# Patient Record
Sex: FEMALE | Race: WHITE | Employment: OTHER | URBAN - METROPOLITAN AREA
[De-identification: names, ages, dates, MRNs, and addresses within clinical notes are randomized per-mention and may not be internally consistent; named-entity substitution may affect disease eponyms.]

---

## 2024-06-03 SDOH — HEALTH STABILITY: PHYSICAL HEALTH: ON AVERAGE, HOW MANY DAYS PER WEEK DO YOU ENGAGE IN MODERATE TO STRENUOUS EXERCISE (LIKE A BRISK WALK)?: 0 DAYS

## 2024-06-06 ENCOUNTER — OFFICE VISIT (OUTPATIENT)
Dept: FAMILY MEDICINE CLINIC | Age: 64
End: 2024-06-06
Payer: COMMERCIAL

## 2024-06-06 VITALS
SYSTOLIC BLOOD PRESSURE: 118 MMHG | WEIGHT: 191.3 LBS | HEIGHT: 63 IN | HEART RATE: 76 BPM | DIASTOLIC BLOOD PRESSURE: 78 MMHG | BODY MASS INDEX: 33.89 KG/M2 | OXYGEN SATURATION: 96 %

## 2024-06-06 DIAGNOSIS — K58.0 IRRITABLE BOWEL SYNDROME WITH DIARRHEA: Primary | ICD-10-CM

## 2024-06-06 DIAGNOSIS — Z12.11 ENCOUNTER FOR SCREENING COLONOSCOPY: ICD-10-CM

## 2024-06-06 DIAGNOSIS — R10.31 GROIN PAIN, RIGHT: ICD-10-CM

## 2024-06-06 DIAGNOSIS — Z12.31 ENCOUNTER FOR SCREENING MAMMOGRAM FOR MALIGNANT NEOPLASM OF BREAST: ICD-10-CM

## 2024-06-06 DIAGNOSIS — Z13.1 DIABETES MELLITUS SCREENING: ICD-10-CM

## 2024-06-06 DIAGNOSIS — Z13.6 SCREENING FOR CARDIOVASCULAR CONDITION: ICD-10-CM

## 2024-06-06 DIAGNOSIS — E28.39 OVARIAN FAILURE: ICD-10-CM

## 2024-06-06 PROCEDURE — 99203 OFFICE O/P NEW LOW 30 MIN: CPT | Performed by: FAMILY MEDICINE

## 2024-06-06 PROCEDURE — 1123F ACP DISCUSS/DSCN MKR DOCD: CPT | Performed by: FAMILY MEDICINE

## 2024-06-06 SDOH — ECONOMIC STABILITY: FOOD INSECURITY: WITHIN THE PAST 12 MONTHS, YOU WORRIED THAT YOUR FOOD WOULD RUN OUT BEFORE YOU GOT MONEY TO BUY MORE.: NEVER TRUE

## 2024-06-06 SDOH — ECONOMIC STABILITY: HOUSING INSECURITY
IN THE LAST 12 MONTHS, WAS THERE A TIME WHEN YOU DID NOT HAVE A STEADY PLACE TO SLEEP OR SLEPT IN A SHELTER (INCLUDING NOW)?: NO

## 2024-06-06 SDOH — ECONOMIC STABILITY: FOOD INSECURITY: WITHIN THE PAST 12 MONTHS, THE FOOD YOU BOUGHT JUST DIDN'T LAST AND YOU DIDN'T HAVE MONEY TO GET MORE.: NEVER TRUE

## 2024-06-06 SDOH — ECONOMIC STABILITY: INCOME INSECURITY: HOW HARD IS IT FOR YOU TO PAY FOR THE VERY BASICS LIKE FOOD, HOUSING, MEDICAL CARE, AND HEATING?: NOT VERY HARD

## 2024-06-06 ASSESSMENT — ENCOUNTER SYMPTOMS
DIARRHEA: 1
CHEST TIGHTNESS: 0
VOMITING: 0
CONSTIPATION: 0
SHORTNESS OF BREATH: 0
NAUSEA: 0
BLOOD IN STOOL: 0
ABDOMINAL PAIN: 0

## 2024-06-06 ASSESSMENT — PATIENT HEALTH QUESTIONNAIRE - PHQ9
SUM OF ALL RESPONSES TO PHQ QUESTIONS 1-9: 0
2. FEELING DOWN, DEPRESSED OR HOPELESS: NOT AT ALL
SUM OF ALL RESPONSES TO PHQ QUESTIONS 1-9: 0
SUM OF ALL RESPONSES TO PHQ QUESTIONS 1-9: 0
SUM OF ALL RESPONSES TO PHQ9 QUESTIONS 1 & 2: 0
1. LITTLE INTEREST OR PLEASURE IN DOING THINGS: NOT AT ALL
SUM OF ALL RESPONSES TO PHQ QUESTIONS 1-9: 0

## 2024-06-06 NOTE — PROGRESS NOTES
disease labs   - Celiac Disease Panel; Future    2. Groin pain, right  Pt instructed to try heat and stretches TID and may try for 1-2 weeks with food an alleve daily  [place]sea in 2wks     3. Encounter for screening colonoscopy  Pt referred for colonoscopy   - Roberto Parrish MD, Internal Medicine, [place]    4. Encounter for screening mammogram for malignant neoplasm of breast  Pt to schedule mammogram   - YAIR DIGITAL SCREEN W OR WO CAD BILATERAL; Future    5. Ovarian failure  Pt to schedule DEXA   - DEXA BONE DENSITY 2 SITES; Future    6. Diabetes mellitus screening  Labs ordered for next week   - Basic Metabolic Panel; Future    7. Screening for cardiovascular condition  Fasting labs to be done next week   - Lipid Panel; Future      FF 39min     Return in about 2 weeks (around 6/20/2024) for chelsea and go over labs and test, Rt groin pain .      Electronically signed by Cici Dubon MD on 6/6/2024 at 11:47 PM

## 2024-06-07 ASSESSMENT — ENCOUNTER SYMPTOMS
COUGH: 0
EYE DISCHARGE: 0
EYE REDNESS: 0

## 2024-06-12 ENCOUNTER — HOSPITAL ENCOUNTER (OUTPATIENT)
Dept: MAMMOGRAPHY | Age: 64
Discharge: HOME | End: 2024-06-14
Attending: FAMILY MEDICINE
Payer: COMMERCIAL

## 2024-06-12 ENCOUNTER — HOSPITAL ENCOUNTER (OUTPATIENT)
Age: 64
Discharge: HOME | End: 2024-06-12
Attending: FAMILY MEDICINE
Payer: COMMERCIAL

## 2024-06-12 ENCOUNTER — HOSPITAL ENCOUNTER (OUTPATIENT)
Dept: BONE DENSITY | Age: 64
Discharge: HOME | End: 2024-06-14
Attending: FAMILY MEDICINE
Payer: COMMERCIAL

## 2024-06-12 DIAGNOSIS — Z12.31 ENCOUNTER FOR SCREENING MAMMOGRAM FOR MALIGNANT NEOPLASM OF BREAST: ICD-10-CM

## 2024-06-12 DIAGNOSIS — E28.39 OVARIAN FAILURE: ICD-10-CM

## 2024-06-12 DIAGNOSIS — K58.0 IRRITABLE BOWEL SYNDROME WITH DIARRHEA: ICD-10-CM

## 2024-06-12 DIAGNOSIS — Z13.6 SCREENING FOR CARDIOVASCULAR CONDITION: ICD-10-CM

## 2024-06-12 DIAGNOSIS — Z13.1 DIABETES MELLITUS SCREENING: ICD-10-CM

## 2024-06-12 LAB
ANION GAP SERPL CALCULATED.3IONS-SCNC: 12 MMOL/L (ref 9–17)
BUN SERPL-MCNC: 8 MG/DL (ref 8–23)
BUN/CREAT SERPL: 11 (ref 9–20)
CALCIUM SERPL-MCNC: 10.7 MG/DL (ref 8.6–10.4)
CHLORIDE SERPL-SCNC: 102 MMOL/L (ref 98–107)
CO2 SERPL-SCNC: 23 MMOL/L (ref 20–31)
CREAT SERPL-MCNC: 0.7 MG/DL (ref 0.5–0.9)
GFR, ESTIMATED: >90 ML/MIN/1.73M2
GLUCOSE SERPL-MCNC: 90 MG/DL (ref 70–99)
POTASSIUM SERPL-SCNC: 4.1 MMOL/L (ref 3.7–5.3)
SODIUM SERPL-SCNC: 137 MMOL/L (ref 135–144)

## 2024-06-12 PROCEDURE — 77080 DXA BONE DENSITY AXIAL: CPT

## 2024-06-12 PROCEDURE — 77063 BREAST TOMOSYNTHESIS BI: CPT

## 2024-06-12 PROCEDURE — 36415 COLL VENOUS BLD VENIPUNCTURE: CPT

## 2024-06-12 PROCEDURE — 82784 ASSAY IGA/IGD/IGG/IGM EACH: CPT

## 2024-06-12 PROCEDURE — 80061 LIPID PANEL: CPT

## 2024-06-12 PROCEDURE — 83516 IMMUNOASSAY NONANTIBODY: CPT

## 2024-06-12 PROCEDURE — 80048 BASIC METABOLIC PNL TOTAL CA: CPT

## 2024-06-13 ENCOUNTER — TELEPHONE (OUTPATIENT)
Dept: FAMILY MEDICINE CLINIC | Age: 64
End: 2024-06-13

## 2024-06-13 LAB
CHOLEST SERPL-MCNC: 178 MG/DL (ref 0–199)
CHOLESTEROL/HDL RATIO: 4
GLIADIN IGA SER IA-ACNC: 1.7 U/ML
GLIADIN IGG SER IA-ACNC: 0.8 U/ML
HDLC SERPL-MCNC: 44 MG/DL
IGA SERPL-MCNC: 384 MG/DL (ref 70–400)
LDLC SERPL CALC-MCNC: 104 MG/DL (ref 0–100)
TRIGL SERPL-MCNC: 153 MG/DL
TTG IGA SER IA-ACNC: 0.6 U/ML
VLDLC SERPL CALC-MCNC: 31 MG/DL

## 2024-06-13 NOTE — TELEPHONE ENCOUNTER
SURGERY SCHEDULING FORM   [place] [address] [facility] [address] [place], [place]    Phone *[phone]   Surgical Scheduling Direct Line Phone *[phone] Fax *[phone]      Shana Orozco 1960 female         Home Phone: [phone]      Cell Phone:    Telephone Information:   Mobile [phone]          Surgeon: Roberto Reynolds MD                 Surgery Date: 7/23/24             Time: 730a    Procedure: Colonoscopy    Diagnosis: Screeing colonoscopy     Important Medical History:  In Epic    Special Inst/Equip: Regular    CPT Codes:    83419  Latex Allergy: No     Cardiac Device:  No    Anesthesia:  General          Admission Type:  Same Day                        Admit Prior to Day of Surgery: No              Preadmission Testing:  Phone Call          PAT Date and Time:     Need Preop Cardiac Clearance: No    Does Patient have Cardiologist/physician?     no    : Violeta                         Date: 6/13/24    Insurance Company Name: [facility] health plan

## 2024-06-19 SDOH — HEALTH STABILITY: PHYSICAL HEALTH: ON AVERAGE, HOW MANY DAYS PER WEEK DO YOU ENGAGE IN MODERATE TO STRENUOUS EXERCISE (LIKE A BRISK WALK)?: 0 DAYS

## 2024-06-19 ASSESSMENT — PATIENT HEALTH QUESTIONNAIRE - PHQ9
SUM OF ALL RESPONSES TO PHQ QUESTIONS 1-9: 0
SUM OF ALL RESPONSES TO PHQ QUESTIONS 1-9: 0
SUM OF ALL RESPONSES TO PHQ9 QUESTIONS 1 & 2: 0
SUM OF ALL RESPONSES TO PHQ QUESTIONS 1-9: 0
1. LITTLE INTEREST OR PLEASURE IN DOING THINGS: NOT AT ALL
SUM OF ALL RESPONSES TO PHQ QUESTIONS 1-9: 0
2. FEELING DOWN, DEPRESSED OR HOPELESS: NOT AT ALL

## 2024-06-19 ASSESSMENT — LIFESTYLE VARIABLES
HOW MANY STANDARD DRINKS CONTAINING ALCOHOL DO YOU HAVE ON A TYPICAL DAY: PATIENT DOES NOT DRINK
HOW MANY STANDARD DRINKS CONTAINING ALCOHOL DO YOU HAVE ON A TYPICAL DAY: 0
HOW OFTEN DO YOU HAVE A DRINK CONTAINING ALCOHOL: 1
HOW OFTEN DO YOU HAVE SIX OR MORE DRINKS ON ONE OCCASION: 1
HOW OFTEN DO YOU HAVE A DRINK CONTAINING ALCOHOL: NEVER

## 2024-06-20 ENCOUNTER — OFFICE VISIT (OUTPATIENT)
Dept: FAMILY MEDICINE CLINIC | Age: 64
End: 2024-06-20
Payer: COMMERCIAL

## 2024-06-20 VITALS
DIASTOLIC BLOOD PRESSURE: 70 MMHG | HEART RATE: 60 BPM | HEIGHT: 63 IN | SYSTOLIC BLOOD PRESSURE: 108 MMHG | OXYGEN SATURATION: 95 % | BODY MASS INDEX: 33.31 KG/M2 | WEIGHT: 188 LBS

## 2024-06-20 DIAGNOSIS — Z00.00 WELCOME TO MEDICARE PREVENTIVE VISIT: Primary | ICD-10-CM

## 2024-06-20 PROCEDURE — G0402 INITIAL PREVENTIVE EXAM: HCPCS | Performed by: FAMILY MEDICINE

## 2024-06-20 PROCEDURE — 1123F ACP DISCUSS/DSCN MKR DOCD: CPT | Performed by: FAMILY MEDICINE

## 2024-06-20 NOTE — PATIENT INSTRUCTIONS
history including lifestyle, illnesses that may run in your family, and various assessments and screenings as appropriate.    After reviewing your medical record and screening and assessments performed today your provider may have ordered immunizations, labs, imaging, and/or referrals for you.  A list of these orders (if applicable) as well as your Preventive Care list are included within your After Visit Summary for your review.    Other Preventive Recommendations:    A preventive eye exam performed by an eye specialist is recommended every 1-2 years to screen for glaucoma; cataracts, macular degeneration, and other eye disorders.  A preventive dental visit is recommended every 6 months.  Try to get at least 150 minutes of exercise per week or 10,000 steps per day on a pedometer .  Order or download the FREE \"Exercise & Physical Activity: Your Everyday Guide\" from [employer]. Call 1-198.272.6165 or search [employer] online.  You need 4275-1643 mg of calcium and 2189-9155 IU of vitamin D per day. It is possible to meet your calcium requirement with diet alone, but a vitamin D supplement is usually necessary to meet this goal.  When exposed to the sun, use a sunscreen that protects against both UVA and UVB radiation with an SPF of 30 or greater. Reapply every 2 to 3 hours or after sweating, drying off with a towel, or swimming.  Always wear a seat belt when traveling in a car. Always wear a helmet when riding a bicycle or motorcycle.

## 2024-06-20 NOTE — PROGRESS NOTES
Medicare Annual Wellness Visit    Shana Orozco is here for Medicare AWV (Patient here today for AWV)    Assessment & Plan   Welcome to Medicare preventive visit    Recommendations for Preventive Services Due: see orders and patient instructions/AVS.  Recommended screening schedule for the next 5-10 years is provided to the patient in written form: see Patient Instructions/AVS.     Return in about 1 year (around 6/20/2025) for medicare wellness.     Subjective   The following acute and/or chronic problems were also addressed today:  1.) Medicare wellness  2.) Rt groin pain --- advil is helping and getting better but slowly. Still hurts with certain movements like getting into and out of the car.     Patient's complete Health Risk Assessment and screening values have been reviewed and are found in Flowsheets. The following problems were reviewed today and where indicated follow up appointments were made and/or referrals ordered.    Positive Risk Factor Screenings with Interventions:                Activity, Diet, and Weight:  On average, how many days per week do you engage in moderate to strenuous exercise (like a brisk walk)?: 0 days       Do you eat balanced/healthy meals regularly?: Yes    Body mass index is 33.3 kg/m². (!) Abnormal    Obesity Interventions:  Pt will walk more and healthy diet                 Advanced Directives:  Do you have a Living Will?: (!) No    Intervention:  has an advanced directive - a copy HAS NOT been provided.                  Pt and her  will think about doing this                  Objective   Vitals:    06/20/24 1405   BP: 108/70   Pulse: 60   SpO2: 95%   Weight: 85.3 kg (188 lb)   Height: 1.6 m (5' 3\")      Body mass index is 33.3 kg/m².      General Appearance: alert and oriented to person, place and time, well-developed and well-nourished, in no acute distress  Skin: warm and dry, no rash or erythema  Head: normocephalic and atraumatic  Eyes: pupils equal, round, and

## 2024-07-09 ENCOUNTER — OFFICE VISIT (OUTPATIENT)
Dept: FAMILY MEDICINE CLINIC | Age: 64
End: 2024-07-09
Payer: COMMERCIAL

## 2024-07-09 VITALS
HEIGHT: 62 IN | SYSTOLIC BLOOD PRESSURE: 116 MMHG | WEIGHT: 186 LBS | BODY MASS INDEX: 34.23 KG/M2 | DIASTOLIC BLOOD PRESSURE: 71 MMHG | HEART RATE: 67 BPM | OXYGEN SATURATION: 94 %

## 2024-07-09 DIAGNOSIS — Z12.11 COLON CANCER SCREENING: Primary | ICD-10-CM

## 2024-07-09 DIAGNOSIS — Z01.818 PRE-OP TESTING: ICD-10-CM

## 2024-07-09 PROCEDURE — 99213 OFFICE O/P EST LOW 20 MIN: CPT | Performed by: INTERNAL MEDICINE

## 2024-07-09 PROCEDURE — 1123F ACP DISCUSS/DSCN MKR DOCD: CPT | Performed by: INTERNAL MEDICINE

## 2024-07-09 RX ORDER — SODIUM, POTASSIUM,MAG SULFATES 17.5-3.13G
SOLUTION, RECONSTITUTED, ORAL ORAL
Qty: 1 EACH | Refills: 0 | Status: SHIPPED | OUTPATIENT
Start: 2024-07-09

## 2024-07-09 ASSESSMENT — ENCOUNTER SYMPTOMS
ABDOMINAL PAIN: 0
NAUSEA: 0
SHORTNESS OF BREATH: 0
SORE THROAT: 0
DIARRHEA: 0
COUGH: 0
RHINORRHEA: 0
CHEST TIGHTNESS: 0
CONSTIPATION: 0
BLOOD IN STOOL: 0

## 2024-07-09 NOTE — PROGRESS NOTES
Genitourinary:  Negative for difficulty urinating, dysuria, frequency, menstrual problem and urgency.   Musculoskeletal:  Negative for arthralgias, joint swelling, myalgias and neck pain.   Skin: Negative.    Neurological:  Negative for syncope.   Psychiatric/Behavioral: Negative.            Objective   Physical Exam  Constitutional:       Appearance: She is well-developed. She is obese.   HENT:      Head: Atraumatic.   Eyes:      Conjunctiva/sclera: Conjunctivae normal.   Cardiovascular:      Rate and Rhythm: Normal rate and regular rhythm.      Heart sounds: Normal heart sounds.   Pulmonary:      Effort: Pulmonary effort is normal.      Breath sounds: Normal breath sounds.   Abdominal:      Palpations: Abdomen is soft.      Tenderness: There is no abdominal tenderness.   Musculoskeletal:         General: Normal range of motion.      Cervical back: Normal range of motion and neck supple.   Lymphadenopathy:      Cervical: No cervical adenopathy.   Skin:     Findings: No rash.   Neurological:      Mental Status: She is alert.   Psychiatric:         Behavior: Behavior normal.         Thought Content: Thought content normal.                  An electronic signature was used to authenticate this note.    --Roberto Reynolds MD

## 2024-07-09 NOTE — PATIENT INSTRUCTIONS
Plan:  1. Colon cancer screening  Set up a screening colonoscopy.  Risk and benefits of the procedure explained including risk for infection, bleeding, perforation, and death.  Verbal and written informed consent obtained.  The bowel prep was explained to Shana and she was instructed to start the prep the day before the procedure (printed directions were given).  Avoid corn 1 week prior to the procedure as this can cause suctioning difficulties during the procedure.  Avoid eating or drinking at least 8 hours prior to the procedure.  Shana was encouraged to call the clinic if she has any questions prior to the procedure.   - COLONOSCOPY W/ OR W/O BIOPSY; Future    2. Pre-op testing  ECG prior to the procedure.   - EKG 12 Lead; Future      Follow up with Dr. Dubon after the procedure.

## 2024-07-15 NOTE — PROGRESS NOTES
[facility]   Preadmission Testing    Name: Shana Orozco  : 1960  Patient Phone: [phone] (home)     Procedure: Colonoscopy  Date of Procedure: 2024  Surgeon: Roberto Reynolds MD    Ht:  157.5 cm (5' 2\")  Wt: 84.4 kg (186 lb)  Wt method: Stated    Allergies:   Allergies   Allergen Reactions    Wheat Diarrhea                There were no vitals filed for this visit.    No LMP recorded. Patient has had a hysterectomy.    Do you take blood thinners?   [] Yes    [x] No         Instructed to stop blood thinners prior to procedure?    [] Yes    [] No      [x] N/A   Do you have sleep apnea?   [] Yes    [x] No     Do you have acid reflux ?   [x] Yes    [] No     Do you have  hiatal hernia?   [] Yes    [x] No    Do you ever experience motion sickness?   [] Yes    [x] No     Have you had a respiratory infection or sore throat in last 4 weeks before surgery?    [] Yes    [x] No     Do you have poorly controlled asthma or COPD?  Difficulty with intubation in past? [] Yes    [x] No      [] Yes    [x] No       Do you have a history of angina in the last month or symptomatic arrhythmia?   [] Yes    [] No     Do you have significant central nervous system disease?   [] Yes    [x] No     Have you had an EKG, labs, or chest xray in last 12 months?  If yes provide copies to anesthesia   [] Yes    [] No       [] Lab    [] EKG    [] CXR  Getting EKG    Have you had a stress test?     [] Yes    [x] No    When/where:    Was it normal?    [] Yes    [] No     Do you or your family have a history of Malignant Hyperthermia?   [] Yes    [x] No           Do you smoke? [] Yes    [x] No      Please refrain from smoking on the day of surgery.      Patient instructed on: [x] NPO Status   [x] Meds to Take  [] Hold GLP-1 Receptor Agonist  [x] Ride Home  [x] No Jewelry/Contact Lenses/Nail Polish  [x] Prep/Lax/Clear Liquids    [] Chlorhexidene     DOS Patient Needs [] HCG   [] Blood Sugar  [] PT/INR    [] T&S       Do you  have any metal allergies? [] Yes    [x] No      If yes, to what metals:                   Patient instructed on the pre-operative, intra-operative, and post-operative process?   Yes  Medication instructions reviewed with patient?  Yes

## 2024-07-16 ENCOUNTER — HOSPITAL ENCOUNTER (OUTPATIENT)
Age: 64
Discharge: HOME | End: 2024-07-16
Payer: COMMERCIAL

## 2024-07-16 DIAGNOSIS — Z01.818 PRE-OP TESTING: ICD-10-CM

## 2024-07-16 PROCEDURE — 93005 ELECTROCARDIOGRAM TRACING: CPT

## 2024-07-17 LAB
EKG ATRIAL RATE: 55 BPM
EKG P AXIS: 67 DEGREES
EKG P-R INTERVAL: 168 MS
EKG Q-T INTERVAL: 456 MS
EKG QRS DURATION: 82 MS
EKG QTC CALCULATION (BAZETT): 436 MS
EKG R AXIS: 54 DEGREES
EKG T AXIS: 70 DEGREES
EKG VENTRICULAR RATE: 55 BPM

## 2024-07-17 PROCEDURE — 93010 ELECTROCARDIOGRAM REPORT: CPT | Performed by: INTERNAL MEDICINE

## 2024-07-19 ENCOUNTER — ANESTHESIA EVENT (OUTPATIENT)
Dept: ENDOSCOPY | Age: 64
End: 2024-07-19
Payer: COMMERCIAL

## 2024-07-19 ENCOUNTER — PREP FOR PROCEDURE (OUTPATIENT)
Dept: FAMILY MEDICINE CLINIC | Age: 64
End: 2024-07-19

## 2024-07-19 RX ORDER — SODIUM CHLORIDE 9 MG/ML
25 INJECTION, SOLUTION INTRAVENOUS PRN
Status: CANCELLED | OUTPATIENT
Start: 2024-07-19

## 2024-07-19 RX ORDER — SODIUM CHLORIDE 0.9 % (FLUSH) 0.9 %
5-40 SYRINGE (ML) INJECTION PRN
Status: CANCELLED | OUTPATIENT
Start: 2024-07-19

## 2024-07-19 RX ORDER — SODIUM CHLORIDE 0.9 % (FLUSH) 0.9 %
5-40 SYRINGE (ML) INJECTION EVERY 12 HOURS SCHEDULED
Status: CANCELLED | OUTPATIENT
Start: 2024-07-19

## 2024-07-19 RX ORDER — SODIUM CHLORIDE, SODIUM LACTATE, POTASSIUM CHLORIDE, CALCIUM CHLORIDE 600; 310; 30; 20 MG/100ML; MG/100ML; MG/100ML; MG/100ML
INJECTION, SOLUTION INTRAVENOUS CONTINUOUS
Status: CANCELLED | OUTPATIENT
Start: 2024-07-19

## 2024-07-23 ENCOUNTER — HOSPITAL ENCOUNTER (OUTPATIENT)
Age: 64
Setting detail: OUTPATIENT SURGERY
Discharge: HOME | End: 2024-07-23
Attending: INTERNAL MEDICINE | Admitting: INTERNAL MEDICINE
Payer: COMMERCIAL

## 2024-07-23 ENCOUNTER — ANESTHESIA (OUTPATIENT)
Dept: ENDOSCOPY | Age: 64
End: 2024-07-23
Payer: COMMERCIAL

## 2024-07-23 VITALS
HEIGHT: 62 IN | TEMPERATURE: 97 F | BODY MASS INDEX: 33.68 KG/M2 | WEIGHT: 183 LBS | SYSTOLIC BLOOD PRESSURE: 93 MMHG | RESPIRATION RATE: 14 BRPM | DIASTOLIC BLOOD PRESSURE: 64 MMHG | OXYGEN SATURATION: 95 % | HEART RATE: 49 BPM

## 2024-07-23 DIAGNOSIS — Z12.11 COLON CANCER SCREENING: ICD-10-CM

## 2024-07-23 PROCEDURE — 3700000000 HC ANESTHESIA ATTENDED CARE: Performed by: INTERNAL MEDICINE

## 2024-07-23 PROCEDURE — 2580000003 HC RX 258: Performed by: NURSE ANESTHETIST, CERTIFIED REGISTERED

## 2024-07-23 PROCEDURE — 3609010600 HC COLONOSCOPY POLYPECTOMY SNARE/COLD BIOPSY: Performed by: INTERNAL MEDICINE

## 2024-07-23 PROCEDURE — 6360000002 HC RX W HCPCS: Performed by: NURSE ANESTHETIST, CERTIFIED REGISTERED

## 2024-07-23 PROCEDURE — 2709999900 HC NON-CHARGEABLE SUPPLY: Performed by: INTERNAL MEDICINE

## 2024-07-23 PROCEDURE — 7100000010 HC PHASE II RECOVERY - FIRST 15 MIN: Performed by: INTERNAL MEDICINE

## 2024-07-23 PROCEDURE — 45380 COLONOSCOPY AND BIOPSY: CPT | Performed by: INTERNAL MEDICINE

## 2024-07-23 PROCEDURE — 3700000001 HC ADD 15 MINUTES (ANESTHESIA): Performed by: INTERNAL MEDICINE

## 2024-07-23 PROCEDURE — 7100000011 HC PHASE II RECOVERY - ADDTL 15 MIN: Performed by: INTERNAL MEDICINE

## 2024-07-23 PROCEDURE — 88305 TISSUE EXAM BY PATHOLOGIST: CPT

## 2024-07-23 PROCEDURE — 2580000003 HC RX 258: Performed by: INTERNAL MEDICINE

## 2024-07-23 PROCEDURE — 2500000003 HC RX 250 WO HCPCS: Performed by: NURSE ANESTHETIST, CERTIFIED REGISTERED

## 2024-07-23 RX ORDER — LIDOCAINE HYDROCHLORIDE 10 MG/ML
INJECTION, SOLUTION EPIDURAL; INFILTRATION; INTRACAUDAL; PERINEURAL PRN
Status: DISCONTINUED | OUTPATIENT
Start: 2024-07-23 | End: 2024-07-23 | Stop reason: SDUPTHER

## 2024-07-23 RX ORDER — PROPOFOL 10 MG/ML
INJECTION, EMULSION INTRAVENOUS CONTINUOUS PRN
Status: DISCONTINUED | OUTPATIENT
Start: 2024-07-23 | End: 2024-07-23 | Stop reason: SDUPTHER

## 2024-07-23 RX ORDER — SODIUM CHLORIDE 0.9 % (FLUSH) 0.9 %
5-40 SYRINGE (ML) INJECTION EVERY 12 HOURS SCHEDULED
Status: DISCONTINUED | OUTPATIENT
Start: 2024-07-23 | End: 2024-07-23 | Stop reason: HOSPADM

## 2024-07-23 RX ORDER — SODIUM CHLORIDE 9 MG/ML
25 INJECTION, SOLUTION INTRAVENOUS PRN
Status: DISCONTINUED | OUTPATIENT
Start: 2024-07-23 | End: 2024-07-23 | Stop reason: HOSPADM

## 2024-07-23 RX ORDER — SODIUM CHLORIDE, SODIUM LACTATE, POTASSIUM CHLORIDE, CALCIUM CHLORIDE 600; 310; 30; 20 MG/100ML; MG/100ML; MG/100ML; MG/100ML
INJECTION, SOLUTION INTRAVENOUS CONTINUOUS
Status: DISCONTINUED | OUTPATIENT
Start: 2024-07-23 | End: 2024-07-23 | Stop reason: HOSPADM

## 2024-07-23 RX ORDER — SODIUM CHLORIDE 0.9 % (FLUSH) 0.9 %
5-40 SYRINGE (ML) INJECTION PRN
Status: DISCONTINUED | OUTPATIENT
Start: 2024-07-23 | End: 2024-07-23 | Stop reason: HOSPADM

## 2024-07-23 RX ORDER — SODIUM CHLORIDE, SODIUM LACTATE, POTASSIUM CHLORIDE, CALCIUM CHLORIDE 600; 310; 30; 20 MG/100ML; MG/100ML; MG/100ML; MG/100ML
INJECTION, SOLUTION INTRAVENOUS CONTINUOUS PRN
Status: DISCONTINUED | OUTPATIENT
Start: 2024-07-23 | End: 2024-07-23 | Stop reason: SDUPTHER

## 2024-07-23 RX ORDER — PHENYLEPHRINE HYDROCHLORIDE 10 MG/ML
INJECTION INTRAVENOUS PRN
Status: DISCONTINUED | OUTPATIENT
Start: 2024-07-23 | End: 2024-07-23 | Stop reason: SDUPTHER

## 2024-07-23 RX ADMIN — PROPOFOL 200 MCG/KG/MIN: 10 INJECTION, EMULSION INTRAVENOUS at 07:08

## 2024-07-23 RX ADMIN — SODIUM CHLORIDE, POTASSIUM CHLORIDE, SODIUM LACTATE AND CALCIUM CHLORIDE: 600; 310; 30; 20 INJECTION, SOLUTION INTRAVENOUS at 06:36

## 2024-07-23 RX ADMIN — LIDOCAINE HYDROCHLORIDE 50 MG: 10 INJECTION, SOLUTION EPIDURAL; INFILTRATION; INTRACAUDAL; PERINEURAL at 07:08

## 2024-07-23 RX ADMIN — PHENYLEPHRINE HYDROCHLORIDE 100 MCG: 10 INJECTION INTRAVENOUS at 07:40

## 2024-07-23 RX ADMIN — PHENYLEPHRINE HYDROCHLORIDE 100 MCG: 10 INJECTION INTRAVENOUS at 07:33

## 2024-07-23 RX ADMIN — SODIUM CHLORIDE, POTASSIUM CHLORIDE, SODIUM LACTATE AND CALCIUM CHLORIDE: 600; 310; 30; 20 INJECTION, SOLUTION INTRAVENOUS at 07:06

## 2024-07-23 ASSESSMENT — PAIN SCALES - GENERAL: PAINLEVEL_OUTOF10: 0

## 2024-07-23 ASSESSMENT — PAIN - FUNCTIONAL ASSESSMENT: PAIN_FUNCTIONAL_ASSESSMENT: NONE - DENIES PAIN

## 2024-07-23 NOTE — ANESTHESIA PRE PROCEDURE
Department of Anesthesiology  Preprocedure Note       Name:  Shana Orozco   Age:  64 y.o.  :  1960                                          MRN:  [id number]         Date:  2024      Surgeon: Surgeon(s):  Roberto Reynolds MD    Procedure: Procedure(s):  COLONOSCOPY    Medications prior to admission:   Prior to Admission medications    Not on File       Current medications:    Current Facility-Administered Medications   Medication Dose Route Frequency Provider Last Rate Last Admin   • lactated ringers IV soln infusion   IntraVENous Continuous Roberto Reynolds MD 75 mL/hr at 24 0636 New Bag at 24 0636   • sodium chloride flush 0.9 % injection 5-40 mL  5-40 mL IntraVENous 2 times per day Roberto Reynolds MD       • sodium chloride flush 0.9 % injection 5-40 mL  5-40 mL IntraVENous PRN Roberto Reynolds MD       • 0.9 % sodium chloride infusion  25 mL IntraVENous PRN Roberto Reynolds MD           Allergies:    Allergies   Allergen Reactions   • Wheat Diarrhea       Problem List:  There is no problem list on file for this patient.      Past Medical History:  History reviewed. No pertinent past medical history.    Past Surgical History:        Procedure Laterality Date   • CHOLECYSTECTOMY     • HYSTERECTOMY, VAGINAL      age 32   • TUBAL LIGATION         Social History:    Social History     Tobacco Use   • Smoking status: Never     Passive exposure: Never   • Smokeless tobacco: Never   Substance Use Topics   • Alcohol use: Never                                Counseling given: Not Answered      Vital Signs (Current):   Vitals:    07/15/24 1357 24 0622 24 0635   BP:   (!) 125/54   Pulse:   66   Resp:   18   Temp:   36.4 °C (97.6 °F)   TempSrc:   Temporal   SpO2:   96%   Weight: 84.4 kg (186 lb) 83 kg (183 lb)    Height: 1.575 m (5' 2\") 1.575 m (5' 2\")                                               BP Readings from Last 3 Encounters:   24 (!) 125/54   24 116/71   24 108/70       NPO Status:

## 2024-07-23 NOTE — PROCEDURES
[facility]                [address] [place] [address]                               COLONOSCOPY      PATIENT NAME: TYREE GIORDANO              : 1960  MED REC NO: [id number]                          ROOM: [facility]  ACCOUNT NO: [id number]                       ADMIT DATE: 2024  PROVIDER: Roberto Reynolds MD      DATE OF PROCEDURE: 2024    PROCEDURE:    1. Video-assisted colonoscopy to the cecum.  2. Sigmoid colon polypectomy with cold cup biopsy forceps.  3. Descending colon polypectomy with cold cup biopsy forceps.    PREOPERATIVE DIAGNOSIS:  Colon cancer screening.    POSTOPERATIVE DIAGNOSES:    1. Colon cancer screening.  2. Sigmoid colon polyp.  3. Descending colon polyp.    ANESTHESIA:  Per Elvis Webster CRNA, MAC anesthetic.    ASSISTANT:  Alyx Enamorado CST    COMPLICATIONS:  None.    ESTIMATED BLOOD LOSS:  Less than 1 mL.    DESCRIPTION OF PROCEDURE:  The patient was brought to the operating room, where procedure was explained along with possible complications and informed consent was obtained.  She was then taken to the minor procedure room, where continuous cardiopulmonary monitors were placed along with O2 via simple mask.  The patient was then placed flat in bed, positioned on her left side, made comfortable, and IV sedation was given per Elvis Webster CRNA.  Once the patient was adequately sedated, the colonoscope was inserted into the rectum with CO2 insufflation per being performed.  Colonoscope was slowly advanced up the sigmoid colon through the descending colon to the splenic flexure.  Upon first pass into the splenic flexure, no abnormalities were noted.  Colonoscope was then advanced to the transverse colon, past the hepatic flexure, and down the ascending colon into the cecum where photodocumentation of the cecum as well as the ileocecal valve was taken.  Upon first pass to the cecum, no abnormalities were seen.  Colonoscope was

## 2024-07-23 NOTE — ANESTHESIA POSTPROCEDURE EVALUATION
Department of Anesthesiology  Postprocedure Note    Patient: Shana Orozco  MRN: [id number]  YOB: 1960  Date of evaluation: 7/23/2024    Procedure Summary       Date: 07/23/24 Room / Location: [place] [address] / [facility] ENDOSCOPY    Anesthesia Start: 0706 Anesthesia Stop:     Procedure: COLONOSCOPY POLYPECTOMY SNARE/BIOPSY (Abdomen) Diagnosis:       Colon cancer screening      (Colon cancer screening [Z12.11])    Surgeons: Roberto Reynolds MD Responsible Provider: Elvis Webster APRN - CRNA    Anesthesia Type: MAC ASA Status: 1            Anesthesia Type: No value filed.    Nahid Phase I: Nahid Score: 10    Nahid Phase II: Nahid Score: 10    Anesthesia Post Evaluation    Patient location during evaluation: PACU  Patient participation: complete - patient participated  Level of consciousness: awake and alert  Pain score: 0  Airway patency: patent  Nausea & Vomiting: no nausea  Cardiovascular status: blood pressure returned to baseline and hemodynamically stable  Respiratory status: acceptable  Hydration status: euvolemic  Pain management: adequate    No notable events documented.

## 2024-07-23 NOTE — H&P
History and Physical       Shana Orozco (:  1960) is a 64 y.o. female,Established patient, here for evaluation of the following chief complaint(s):  Colonoscopy (Procedure 24. )           Assessment & Plan  1. Colon cancer screening  -     COLONOSCOPY W/ OR W/O BIOPSY; Future  2. Pre-op testing  -     EKG 12 Lead; Future        Plan:  1. Colon cancer screening  Set up a screening colonoscopy.  Risk and benefits of the procedure explained including risk for infection, bleeding, perforation, and death.  Verbal and written informed consent obtained.  The bowel prep was explained to Shana and she was instructed to start the prep the day before the procedure (printed directions were given).  Avoid corn 1 week prior to the procedure as this can cause suctioning difficulties during the procedure.  Avoid eating or drinking at least 8 hours prior to the procedure.  Shana was encouraged to call the clinic if she has any questions prior to the procedure.   - COLONOSCOPY W/ OR W/O BIOPSY; Future     2. Pre-op testing  ECG prior to the procedure.   - EKG 12 Lead; Future        Follow up with Dr. Dubon after the procedure.                   Subjective   Shana a patient of Dr. Dubon presents to be evaluated for a colonoscopy.  Shana is 64 y.o. and has not had a colonoscopy in the past.  Currently Shana denies rectal bleeding, denies bowel habit changes, and denies abdominal pain.  There is not a family history of colon cancer.     No current facility-administered medications on file prior to encounter.     No current outpatient medications on file prior to encounter.     Allergies   Allergen Reactions    Wheat Diarrhea        No past medical history on file.     Past Surgical History:  No date: CHOLECYSTECTOMY  No date: HYSTERECTOMY, VAGINAL      Comment:  age 32  No date: TUBAL LIGATION     Social History    Socioeconomic History      Marital status:       Spouse name: Not on file      Number  of children: Not on file      Years of education: Not on file      Highest education level: Not on file    Occupational History      Not on file    Tobacco Use      Smoking status: Never      Smokeless tobacco: Never    Substance and Sexual Activity      Alcohol use: Never      Drug use: Never      Sexual activity: Yes        Partners: Male    Other Topics      Concerns:        Not on file    Social History Narrative      Not on file     Social Determinants of Health  Financial Resource Strain: Low Risk  (6/6/2024)      Overall Financial Resource Strain (CARDIA)          Difficulty of Paying Living Expenses: Not very hard  Food Insecurity: No Food Insecurity (6/6/2024)      Hunger Vital Sign          Worried About Running Out of Food in the Last Year: Never true          Ran Out of Food in the Last Year: Never true  Transportation Needs: Unknown (6/6/2024)      PRAPARE - Transportation          Lack of Transportation (Medical): Not on file          Lack of Transportation (Non-Medical): No  Physical Activity: Unknown (6/19/2024)      Exercise Vital Sign          Days of Exercise per Week: 0 days          Minutes of Exercise per Session: Not on file  Stress: Not on file  Social Connections: Not on file  Intimate Partner Violence: Not on file  Housing Stability: Unknown (6/6/2024)      Housing Stability Vital Sign          Unable to Pay for Housing in the Last Year: Not on file          Number of Places Lived in the Last Year: Not on file          Unstable Housing in the Last Year: No     Review of patient's family history indicates:  Problem: Stroke      Relation: Mother          Age of Onset: (Not Specified)  Problem: Alcohol Abuse      Relation: Father          Age of Onset: (Not Specified)  Problem: High Blood Pressure      Relation: Father          Age of Onset: (Not Specified)  Problem: Prostate Cancer      Relation: Father          Age of Onset: (Not Specified)        No current outpatient medications on file

## 2024-07-23 NOTE — BRIEF OP NOTE
Brief Postoperative Note      Patient: Shana Orozco  YOB: 1960  MRN: [id number]    Date of Procedure: 7/23/2024    Pre-Op Diagnosis Codes:     * Colon cancer screening [Z12.11]    Post-Op Diagnosis:  Colon cancer screening.     Sigmoid colon polyp.     Descending colon polyp.       Procedure(s):  Colonoscopy to the Cecum.  Sigmoid colon polypectomy with cold cup biopsy forceps.  Descending colon polypectomy with cold cup biopsy forceps.     Surgeon(s):  Roberto Reynolds MD    Assistant:  Alyx Enamorado CST    Anesthesia: Elvis Webster CRNA.  MAC anesthesia.      Estimated Blood Loss (mL): < 1 ml    Complications: None    Specimens:   ID Type Source Tests Collected by Time Destination   A : descending colon polyp biopsy Tissue Colon-Descending SURGICAL PATHOLOGY Roberto Reynolds MD 7/23/2024 0729    B : sigmoid colon polyp biopsy Tissue Sigmoid Colon SURGICAL PATHOLOGY Roberto Reynolds MD 7/23/2024 0734        Implants:  * No implants in log *      Drains: * No LDAs found *    Electronically signed by Roberto Reynolds MD on 7/23/2024 at 7:42 AM

## 2024-07-23 NOTE — DISCHARGE INSTRUCTIONS
Discharge Instructions    Admission Date:  7/23/2024  Discharge Date:  7/23/24    Disposition:  Home    Activity:  As tolerated    Diet:  General     Discharge Instructions:    Follow Up:  With your primary care physician (Dr. Dubon) in 2 weeks.    Discharge Instructions for Colonoscopy    Do not drive or operate machinery for 24 hours.  Do not make important personal or business decisions for 24 hours.   Do not drink alcoholic beverages for 24 hours.  Do not smoke tobacco products for 24 hours.  It is normal to have a feeling of fullness or mild cramping in your abdomen afterwards due to air that is put into your bowel during the procedure. Mild activities such as walking will help you pass the air.  You may resume your regular diet.   Results from a biopsy may take up to 2 weeks to return from pathology.  Make a follow-up appointment with PCP to be seen in 2 weeks.     SEEK MEDICAL ATTENTION IF:    Chest Pain or trouble breathing.    Persistent and significant bleeding from your rectum, such as soaking through clothing and/or feeling dizzy and sweating.    A fever above 101F or if you have chills.    If symptoms are severe call 911 or go to the nearest Emergency Room.

## 2024-07-26 LAB — SURGICAL PATHOLOGY REPORT: NORMAL

## (undated) DEVICE — FORCEPS BX L240CM JAW DIA2.8MM L CAP W/ NDL MIC MESH TOOTH

## (undated) DEVICE — JELLY LUBRICATING 4OZ FLIP TOP TB E Z

## (undated) DEVICE — ENDO KIT W/SYRINGE: Brand: MEDLINE INDUSTRIES, INC.

## (undated) DEVICE — Device: Brand: DEFENDO VALVE AND CONNECTOR KIT

## (undated) DEVICE — SYRINGE MED 50ML LUERSLIP TIP

## (undated) DEVICE — 4-PORT MANIFOLD: Brand: NEPTUNE 2